# Patient Record
Sex: MALE
[De-identification: names, ages, dates, MRNs, and addresses within clinical notes are randomized per-mention and may not be internally consistent; named-entity substitution may affect disease eponyms.]

---

## 2023-05-08 PROBLEM — Z00.00 ENCOUNTER FOR PREVENTIVE HEALTH EXAMINATION: Status: ACTIVE | Noted: 2023-05-08

## 2023-05-25 ENCOUNTER — NON-APPOINTMENT (OUTPATIENT)
Age: 33
End: 2023-05-25

## 2023-05-25 ENCOUNTER — APPOINTMENT (OUTPATIENT)
Dept: ORTHOPEDIC SURGERY | Facility: CLINIC | Age: 33
End: 2023-05-25
Payer: COMMERCIAL

## 2023-05-25 DIAGNOSIS — S99.911A UNSPECIFIED INJURY OF RIGHT ANKLE, INITIAL ENCOUNTER: ICD-10-CM

## 2023-05-25 PROCEDURE — 73590 X-RAY EXAM OF LOWER LEG: CPT | Mod: RT

## 2023-05-25 PROCEDURE — 99203 OFFICE O/P NEW LOW 30 MIN: CPT | Mod: 25

## 2023-05-25 PROCEDURE — 73610 X-RAY EXAM OF ANKLE: CPT | Mod: RT

## 2023-05-25 NOTE — HISTORY OF PRESENT ILLNESS
[de-identified] : MARTHA Thurman is a 33 y.o. gentleman who presents to the office with right ankle pain. He has a history of ORIF right tibia from 2/2021 at [facility]. He had 2 surgeries, an ex-fix and an ORIF of the right ankle. He has followed up with multiple orthopedic doctors and pain management for this right ankle. He notes numbness/tinglings of the plantar aspect of his right foot. He saw a pain management doctor last week who has prescribed him pain medication and muscle relaxers. He is unable to recall the names of the medications. He is participating in PT once a week and uses heat for the stiffness. He is here for a second opinion of his right ankle. He is involved in a lawsuit for this injury.\par \par PMH: DM Type 1 on Insulin, not well controlled.  A1C: around 8

## 2023-05-25 NOTE — PHYSICAL EXAM
[de-identified] : The patient is sitting comfortably in the exam room. \par Right leg.\par -Skin is intact, no swelling, no ecchymosis\par -Range of motion\par -Negative Lachman, negative anterior drawer, negative posterior drawer\par -Negative Che\par -Sensation is intact L1-S1\par -5/5 EHL, FHL, TA, GS, quadriceps, hamstrings\par -Foot is warm and well-perfused, palpable dorsalis pedis pulse\par  [de-identified] : Xrays of the right ankle and right tibia/fibula were taken in the office today, 5/25/23.

## 2023-05-25 NOTE — DISCUSSION/SUMMARY
[de-identified] : 33-year-old s/p ORIF right tibia, approximately 2 years and 3 months out.\par -Weightbearing as tolerated right LE\par -Physical therapy: to improve ROM, strength, gait training\par -Follow-up prn with x-rays of the right ankle and tibia at that time\par -All the patient's questions and concerns were addressed during this visit\par \par

## 2023-12-14 ENCOUNTER — APPOINTMENT (OUTPATIENT)
Dept: ORTHOPEDIC SURGERY | Facility: CLINIC | Age: 33
End: 2023-12-14
Payer: COMMERCIAL

## 2023-12-14 VITALS — WEIGHT: 155 LBS | BODY MASS INDEX: 25.83 KG/M2 | HEIGHT: 65 IN

## 2023-12-14 DIAGNOSIS — Z87.81 OTHER SPECIFIED POSTPROCEDURAL STATES: ICD-10-CM

## 2023-12-14 DIAGNOSIS — Z98.890 OTHER SPECIFIED POSTPROCEDURAL STATES: ICD-10-CM

## 2023-12-14 PROCEDURE — 73590 X-RAY EXAM OF LOWER LEG: CPT | Mod: RT

## 2023-12-14 PROCEDURE — 73610 X-RAY EXAM OF ANKLE: CPT | Mod: RT

## 2023-12-14 PROCEDURE — 99213 OFFICE O/P EST LOW 20 MIN: CPT | Mod: 25

## 2023-12-14 NOTE — PHYSICAL EXAM
[de-identified] : The patient is sitting comfortably in the exam room.  Right ankle. -Skin is intact, no swelling, no ecchymosis -Incisions are well-healed, no erythema, no signs of infection -He has an incision medially, anterolaterally, and posterior laterally.  The incisions are well-healed but have a significant amount of scarring. -Range of motion ankle 0-40 -Nontender to palpation over the midfoot.  No pain with movement of first metatarsal relative to the second metatarsal.  No plantar ecchymoses. -Nontender to palpation over the medial malleolus and the distal fibula -Sensation is grossly intact L1-S1 -5/5 EHL, FHL, TA, GS, quadriceps, hamstrings -Foot is warm and well-perfused, palpable dorsalis pedis pulse  [de-identified] : Xrays of the right ankle and right tibia/fibula were taken in the office today, 12/14/23.  X-rays of the ankle include AP, mortise, lateral.  X-rays show excellent overall alignment of the ankle.  The patient is status post open reduction internal fixation of the distal tibia and distal fibula.  The patient has an anterior lateral plate on the tibia and a lateral plate on the fibula.  No lucencies around the screws.  The fractures are healed and remodeled.  The joint is well aligned on the lateral image.  X-rays of the tibia include AP and lateral.  X-rays show good overall alignment of the tibia and fibula.  Proximal to the plates described in the ankle x-rays you can see the areas where there was potentially an external fixator in the tibia.  These holes are healing and remodeling.

## 2023-12-14 NOTE — DISCUSSION/SUMMARY
[de-identified] : 33-year-old s/p ORIF right tibia, approximately 2 years and 10 months out.  -X-ray and physical exam findings were discussed with the patient -Discussion was held with the patient about controlling his sugar levels.  -Weightbearing as tolerated right LE -Activity as tolerated such as riding a bicycle would help with ROM and strengthening -Physical therapy: to improve ROM, strength, gait training -Follow-up 4 months with x-rays of the right ankle and right tibia at that time -All the patient's questions and concerns were addressed during this visit

## 2023-12-14 NOTE — HISTORY OF PRESENT ILLNESS
[de-identified] : MARTHA Thurman is a 33 y.o. gentleman who presents for follow up evaluation s/p ORIF right tibia from 2/2021 at [facility]. He had 2 surgeries, an ex-fix and an ORIF of the right ankle. He is participating in PT twice a week. He notes tightness of the right ankle. He feels he is about 70-80% better since the accident. He is seeing pain management for the pain, and he is taking gabapentin, amitriptyline and cyclobenzaprine. His glucose levels have been in the 200s.   Of note, he is involved in a lawsuit for this injury. PMH: DM Type 1 on Insulin, not well controlled.  A1C: around 8

## 2024-05-30 ENCOUNTER — APPOINTMENT (OUTPATIENT)
Dept: ORTHOPEDIC SURGERY | Facility: CLINIC | Age: 34
End: 2024-05-30

## 2024-06-13 NOTE — DISCUSSION/SUMMARY
[de-identified] : 34-year-old s/p ORIF right tibia, approximately 3 years and 3 months out.  -X-ray and physical exam findings were discussed with the patient -Discussion was held with the patient about controlling his sugar levels.  -Weightbearing as tolerated right LE -Activity as tolerated such as riding a bicycle would help with ROM and strengthening -Physical therapy: to improve ROM, strength, gait training -Follow-up 4 months with x-rays of the right ankle and right tibia at that time -All the patient's questions and concerns were addressed during this visit

## 2024-06-13 NOTE — PHYSICAL EXAM
[de-identified] : The patient is sitting comfortably in the exam room.  Right ankle. -Skin is intact, no swelling, no ecchymosis -Incisions are well-healed, no erythema, no signs of infection -He has an incision medially, anterolaterally, and posterior laterally.  The incisions are well-healed but have a significant amount of scarring. -Range of motion ankle 0-40 -Nontender to palpation over the midfoot.  No pain with movement of first metatarsal relative to the second metatarsal.  No plantar ecchymoses. -Nontender to palpation over the medial malleolus and the distal fibula -Sensation is grossly intact L1-S1 -5/5 EHL, FHL, TA, GS, quadriceps, hamstrings -Foot is warm and well-perfused, palpable dorsalis pedis pulse  [de-identified] : Xrays of the right ankle and right tibia/fibula were taken in the office today, 5/30/24.  X-rays of the ankle include AP, mortise, lateral.  X-rays show excellent overall alignment of the ankle.  The patient is status post open reduction internal fixation of the distal tibia and distal fibula.  The patient has an anterior lateral plate on the tibia and a lateral plate on the fibula.  No lucencies around the screws.  The fractures are healed and remodeled.  The joint is well aligned on the lateral image.  X-rays of the tibia include AP and lateral.  X-rays show good overall alignment of the tibia and fibula.  Proximal to the plates described in the ankle x-rays you can see the areas where there was potentially an external fixator in the tibia.  These holes are healing and remodeling.

## 2024-06-13 NOTE — REASON FOR VISIT
[Follow-Up Visit] : a follow-up visit for [[email or web address]] : s/p ORIF right tibia from 2/2021 at [facility].

## 2024-06-13 NOTE — HISTORY OF PRESENT ILLNESS
[de-identified] : MARTHA Thurman is a 34 y.o. gentleman who presents for follow up evaluation s/p ORIF right tibia from 2/2021 at [facility]. He had two surgeries, an ex-fix and an ORIF of the right ankle. Since his last visit he states he is feeling better.  Of note, he is involved in a lawsuit for this injury. PMH: DM Type 1 on Insulin, not well controlled.  A1C: around 8

## 2024-10-16 ENCOUNTER — APPOINTMENT (OUTPATIENT)
Dept: ORTHOPEDIC SURGERY | Facility: CLINIC | Age: 34
End: 2024-10-16

## 2024-12-05 ENCOUNTER — APPOINTMENT (OUTPATIENT)
Dept: ORTHOPEDIC SURGERY | Facility: CLINIC | Age: 34
End: 2024-12-05